# Patient Record
Sex: MALE | URBAN - METROPOLITAN AREA
[De-identification: names, ages, dates, MRNs, and addresses within clinical notes are randomized per-mention and may not be internally consistent; named-entity substitution may affect disease eponyms.]

---

## 2023-06-08 ENCOUNTER — AMB VIDEO VISIT (OUTPATIENT)
Dept: OTHER | Facility: HOSPITAL | Age: 33
End: 2023-06-08

## 2023-06-08 VITALS — RESPIRATION RATE: 16 BRPM

## 2023-06-08 DIAGNOSIS — J30.9 ALLERGIC RHINITIS, UNSPECIFIED SEASONALITY, UNSPECIFIED TRIGGER: Primary | ICD-10-CM

## 2023-06-08 DIAGNOSIS — Z76.89 ENCOUNTER TO ESTABLISH CARE: ICD-10-CM

## 2023-06-08 PROCEDURE — ECARE PR SL URGENT CARE VIRTUAL VISIT: Performed by: NURSE PRACTITIONER

## 2023-06-08 RX ORDER — FLUTICASONE PROPIONATE 50 MCG
1 SPRAY, SUSPENSION (ML) NASAL DAILY
Qty: 16 G | Refills: 1 | Status: SHIPPED | OUTPATIENT
Start: 2023-06-08

## 2023-06-08 NOTE — PROGRESS NOTES
Video Visit - Cyr Page 34 y o  male MRN: [id number]    REQUIRED DOCUMENTATION:         1  This service was provided via [facility]  2  Provider located at [address] [place] [address] Cristian Gann [address] [phone]  3  [facility] provider: LUZMARIA Gonzalez  4  Identify all parties in room with patient during [facility] visit:  Patient   5  After connecting through [employer], patient was identified by name and date of birth  Patient was then informed that this was a Telemedicine visit and that the exam was being conducted confidentially over secure lines  My office door was closed  No one else was in the room  Patient acknowledged consent and understanding of privacy and security of the Telemedicine visit  I informed the patient that I have reviewed their record in Epic and presented the opportunity for them to ask any questions regarding the visit today  The patient agreed to participate  This is a 34year old male here today for video visit  He states symptoms started last night  He states he started last night with itchy eyes and cough  He has a headache  No fever, body aches or chills  He denies any chest pain or sob  He does have cough  He denies any wheezing  He states he took a zyrtec  He states his nose is very stuffed up  He denies any nausea, vomiting or abd pain  He has not tried any tylenol or motrin for headache  He does have some seasonal allergy  He also notes he was working outside all day yesterday  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  Review of Systems   Constitutional: Negative for activity change, chills, fatigue and fever  HENT: Positive for congestion and rhinorrhea  Respiratory: Positive for cough  Negative for shortness of breath and wheezing  Cardiovascular: Negative for chest pain  Gastrointestinal: Negative for diarrhea and vomiting  Neurological: Positive for headaches   Negative for dizziness and light-headedness  Psychiatric/Behavioral: Negative  Vitals:    06/08/23 1507   Resp: 16     Physical Exam  Constitutional:       General: He is not in acute distress  Appearance: Normal appearance  He is not ill-appearing or toxic-appearing  HENT:      Head: Normocephalic and atraumatic  Nose: Congestion present  Eyes:      Conjunctiva/sclera: Conjunctivae normal    Pulmonary:      Effort: Pulmonary effort is normal  No respiratory distress  Comments: No cough or audible wheezing  Skin:     Comments: No rash on head or neck  Neurological:      Mental Status: He is alert and oriented to person, place, and time  Psychiatric:         Mood and Affect: Mood normal          Behavior: Behavior normal          Thought Content: Thought content normal          Judgment: Judgment normal        Diagnoses and all orders for this visit:    Allergic rhinitis, unspecified seasonality, unspecified trigger  -     fluticasone (FLONASE) 50 mcg/act nasal spray; 1 spray into each nostril daily    Encounter to establish care  -     Ambulatory Referral to [facility] Future      Patient Instructions   This is may be related to poor air quality due outside  Would recommend you stay inside  Continue zyrtec  Start flonase  OTC cough and cold medication will also help with symptoms  Follow up with PCP if no improvement  Go to ER with any worsening symptoms, chest pain, sob, wheezing, fevers or flu like symptoms  Follow up with PCP if not improved, if symptoms are worse, go to the ER

## 2023-06-08 NOTE — LETTER
[facility] VISIT VIR  [address] [place] [address]    June 8, 2023     Patient: Yazan Vernon   YOB: 1990   Date of Visit: 6/8/2023       To Whom it May Concern:    Yazan Vernon is under my professional care  He was seen virtually on 6/8/2023  He may return to work on 06/09/2023   If you have any questions or concerns, please don't hesitate to call           Sincerely,          LUZMARIA Rodriguez        CC: No Recipients

## 2023-06-08 NOTE — PATIENT INSTRUCTIONS
This is may be related to poor air quality due outside  Would recommend you stay inside  Continue zyrtec  Start flonase  OTC cough and cold medication will also help with symptoms  Follow up with PCP if no improvement  Go to ER with any worsening symptoms, chest pain, sob, wheezing, fevers or flu like symptoms

## 2023-06-12 NOTE — CARE ANYWHERE EVISITS
Visit Summary for [facility]   Heydi - Gender: Male - Date of Birth: [id number]  Date: [id number] - Duration: 8 minutes  Patient: [facility]   Heydi  Provider: [id number]    Patient Contact Information  Address  [address] [place] [address], [employer] [facility] [address]  [phone]    Visit Topics  Headache [Added By: Self - 2023-06-08]  Earache [Added By: Self - 2023-06-08]    Triage Questions   What is your current physical address in the event of a medical emergency? Answer []  Are you allergic to any medications? Answer []  Are you now or could you be pregnant? Answer []  Do you have any immune system compromise or chronic lung   disease? Answer []  Do you have any vulnerable family members in the home (infant, pregnant, cancer, elderly)? Answer []     Conversation Transcripts  [0A][0A] [Notification] You are connected with Cindyl Clerance Lefort, Urgent Care Specialist [0A][Notification] Lety Mcnamara is located in [place]  [0A][Notification] Lety Mcnamara has shared health history  Robin Resendiz  [0A]    Diagnosis  Allergic rhinitis, unspecified    Procedures  Value: 62681 Code: CPT-4 UNLISTED E&M SERVICE    Medications Prescribed    No prescriptions ordered    Electronically signed by: Clerance Lefort, Cindyl(NPI [id number])

## 2023-06-25 ENCOUNTER — AMB VIDEO VISIT (OUTPATIENT)
Dept: OTHER | Facility: HOSPITAL | Age: 33
End: 2023-06-25

## 2023-06-25 PROCEDURE — ECARE PR SL URGENT CARE VIRTUAL VISIT: Performed by: FAMILY MEDICINE

## 2023-06-25 NOTE — CARE ANYWHERE EVISITS
Visit Summary for [facility] - Gender: Male - Date of Birth: [id number]  Date: [id number] - Duration: 4 minutes  Patient: [facility]  Provider: Ede Hall    Patient Contact Information  Address  [address] [place] [address], [facility] [address]  [phone]    Visit Topics  allergies, headache [Added By: Self - 2023-06-25]    Sick Slip  Reason [ILLNESS]  Remarks [He was seen on 6/25/23  He should be excused from work on 6/25/23 ]  Triage Questions   What is your current physical address in the event of a medical emergency? Answer []  Are you allergic to any medications? Answer []  Are you now or could you be pregnant? Answer []  Do you have any immune system compromise or chronic lung   disease? Answer []  Do you have any vulnerable family members in the home (infant, pregnant, cancer, elderly)? Answer []     Conversation Transcripts  [0A][0A] [Notification] Major Blevins, Global Staff, will help you prepare for your visit  She is assisting Family Michele Physician [0A][[address], and thank you for connecting  While you are waiting for the doctor, are there   any questions I can answer for you about our service? Please contact customer service if you have questions about billing, insurance, or technical issues  Visits work best with a stable WiFi connection, so please make sure you are connected before we   begin [0A][Notification] Major Blevins has left the room  [0A][Notification] You are connected with Family Michele Physician [0A][Notification] Eal Gates is located in [place]  [0A][Notification] Ela Gates has shared health   history  Vijay Talib  [0A][Notification] Ede Hall has added a note  [0A]    Diagnosis  Acute atopic conjunctivitis, bilateral    Procedures  Value: 41233 Code: CPT-4 UNLISTED E&M SERVICE    Medications Prescribed    olopatadine      Frequency :   Patient Instructions : one drop in to each eye twice a day for 7days  Refills : 0  Instructions to the Pharmacist : 7day supply  Substitutions allowed      Provider Notes  [0A][0A] eyes itchy,  watering[0A]not red[0A]no coughing [0A]no fever[0A]no mucus[0A]not used any medication[0A]Pregnancy: N/A[0A]PMH: none[0A]â€¢ Current Medications: none[0A]â€¢ Current Allergies: NKDA[0A]â€¢ Physical Exam: [0A]General- Well-developed,   well-nourished  In no apparent distress  Appears comfortable in home[0A]environment  Alert  [0A]HEENT-Normocephalic, atraumatic  No conjunctival injection or scleral icterus is noted  Pupils are[0A]equal and round  No rhinorrhea  [0A]Respiratory-Chest   exam reveals visually symmetric expansion, normal respiratory rate with no[0A]retractions, no accessory muscle use  No nasal flaring [0A]Neurolgoy: no facial droop, no active tremors[0A]skin: not pallor, no jaundice, no rash[0A]Physical exam limited due   to telehealth[0A]â€¢ Plan/Prescribing:[0A]Allergic conjunctivitis: [0A]patanol eye drops[0A]if crusting or red eyes should call back[0A]work excuse given for today[0A]f/u with  family doc[0A]The patient voiced understanding and agreement with treatment   plan  [0A]â€¢ Referral: [0A]if your symptoms are not getting better should see PCP[0A]If you you have chest pain, shortness of breath , feels some thing not right should[0A]go to ER  or call 911   [0A]ï‚·  If you received a prescription at this visit and   you have a question or problem please call [phone] for prescription assistance[0A]ï‚·  Please print a copy of this note and send it to your regular doctor, or take it to your next visit so it[0A]may be included in your medical record [0A]ï‚·    Please see your primary care provider on an annual basis or more frequently if directed[0A]    Electronically signed byHarley Michael(NPI [id number])

## 2023-06-26 ENCOUNTER — AMB VIDEO VISIT (OUTPATIENT)
Dept: OTHER | Facility: HOSPITAL | Age: 33
End: 2023-06-26

## 2023-07-11 ENCOUNTER — AMB VIDEO VISIT (OUTPATIENT)
Dept: OTHER | Facility: HOSPITAL | Age: 33
End: 2023-07-11

## 2023-07-11 DIAGNOSIS — K52.9 GASTROENTERITIS: Primary | ICD-10-CM

## 2023-07-11 PROCEDURE — ECARE PR SL URGENT CARE VIRTUAL VISIT: Performed by: PHYSICIAN ASSISTANT

## 2023-07-11 RX ORDER — ONDANSETRON 4 MG/1
4 TABLET, FILM COATED ORAL EVERY 8 HOURS PRN
Qty: 20 TABLET | Refills: 0 | Status: SHIPPED | OUTPATIENT
Start: 2023-07-11

## 2023-07-11 NOTE — LETTER
July 11, 2023     Patient: Alanna Felton   YOB: 1990   Date of Visit: 7/11/2023       To Whom it May Concern:    Alanna Felton is under my professional care. He was seen virtually on 7/11/2023. He may return to work on 7/12/23 . If you have any questions or concerns, please don't hesitate to call.          Sincerely,          Amita Brito PA-C        CC: No Recipients

## 2023-07-11 NOTE — CARE ANYWHERE EVISITS
Visit Summary for DOT Arnulfo LOPEZ - Gender: Male - Date of Birth: [id number]  Date: [id number] - Duration: 12 minutes  Patient: DOT LOPEZ  Provider: Francois Enriquez PA-C    Patient Contact Information  Address  [place]; [address]  [phone]    Visit Topics  Stomachache [Added By: Self - 2023-07-11]    Triage Questions   What is your current physical address in the event of a medical emergency? Answer []  Are you allergic to any medications? Answer []  Are you now or could you be pregnant? Answer []  Do you have any immune system compromise or chronic lung   disease? Answer []  Do you have any vulnerable family members in the home (infant, pregnant, cancer, elderly)? Answer []     Conversation Transcripts  [0A][0A] [Notification] You are connected with Francois Enriquez PA-C, Urgent Care [address] [place] [address] is located in [address] [place] [address] [address]. [0A][Notification] Rommie Severin has shared health history. [facility] .[0A]    Diagnosis  Infectious gastroenteritis and colitis, unspecified    Procedures  Value: 98869 Code: CPT-4 UNLISTED E&M SERVICE    Medications Prescribed    No prescriptions ordered    Electronically signed by: Ayala Zavala(NPI [id number])

## 2023-07-11 NOTE — PROGRESS NOTES
Video Visit - Sary Ziegler 34 y.o. male MRN: [id number]    REQUIRED DOCUMENTATION:         1. This service was provided via [facility]. 2. Provider located at [facility]  [address] [address] [phone]. 3. [facility] provider: Diego Kwan PA-C.  4. Identify all parties in room with patient during [facility] visit:  No one else  5. After connecting through [employer], patient was identified by name and date of birth and 's license. Patient was then informed that this was a Telemedicine visit and that the exam was being conducted confidentially over secure lines. My office door was closed. No one else was in the room. Patient acknowledged consent and understanding of privacy and security of the Telemedicine visit. I informed the patient that I have reviewed their record in Epic and presented the opportunity for them to ask any questions regarding the visit today. The patient agreed to participate. VITALS: Heart Rate: 84 BPM, Respiratory Rate: 20 RPM, Temperature Unavailable° F, Blood Pressure Unavailable mmHg, Pulse Ox Unavailable % on RA    HPI  Pt reports woke up today with stomach pain which he describes as heart burn which was periumbilical to epigastric. Associated with diarrhea x 2, no blood or mucous. Nothing tried to relieve pain. Reports it is resolved now. Ate an [employer] sandwich for lunch yesterday and didn't eat dinner. Denies vomiting, fevers. Denies recent travel, recent abx, recent sick contacts. No surgical hx. No cold sx. Physical Exam  Constitutional:       General: He is not in acute distress. Appearance: Normal appearance. He is not toxic-appearing. HENT:      Head: Normocephalic and atraumatic. Nose: No rhinorrhea. Mouth/Throat:      Mouth: Mucous membranes are moist.   Eyes:      Conjunctiva/sclera: Conjunctivae normal.   Cardiovascular:      Rate and Rhythm: Normal rate.    Pulmonary:      Effort: Pulmonary effort is normal. No respiratory distress. Breath sounds: No wheezing (no gross audible wheeze through computer). Abdominal:      General: Abdomen is flat. Palpations: Abdomen is soft. Tenderness: There is abdominal tenderness (mild subjective LUQ and LLQ). Musculoskeletal:      Cervical back: Normal range of motion. Skin:     Coloration: Skin is pale (likely normal skin tone). Findings: No rash (on face or neck). Neurological:      Mental Status: He is alert. Cranial Nerves: No dysarthria or facial asymmetry. Psychiatric:         Mood and Affect: Mood normal.         Behavior: Behavior normal.         Diagnoses and all orders for this visit:    Gastroenteritis  -     ondansetron (ZOFRAN) 4 mg tablet; Take 1 tablet (4 mg total) by mouth every 8 (eight) hours as needed for nausea or vomiting      Patient Instructions     Avoid dairy x 1-2 weeks  Clear liquid to bland diet as tolerated. No greasy, spicy or acidic foods/drinks. Schedule a follow-up appointment with your primary care physician for recheck in 2-3 days. If you cannot see your PCP, you can schedule a follow up appointment at a [facility]. Go to the emergency department if you develop any new or worsening symptoms including worsening abdominal pain, fevers, or anything else that is concerning. Excuses can be found in "Letters" section of [employer] amanda. Can print if opened from a [address] phone number is [phone] if you need assistance or have further questions    1 21 [phone]) [employer] ([phone])  Schedule or Reschedule Outpatient Testing - Option 2  Billing - Option 3  General Info - Option 4  [facility] Help - Option 5  Comprehensive Spine Program - Option 6   COVID - Option 7    Gastroenteritis   WHAT YOU NEED TO KNOW:   Gastroenteritis, or stomach flu, is an infection of the stomach and intestines.         DISCHARGE INSTRUCTIONS:   Call 911 for any of the following:   • You have trouble breathing or a very fast pulse.      Return to the emergency department if:   • You see blood in your diarrhea. • You cannot stop vomiting. • You have not urinated for 12 hours. • You feel like you are going to faint. Contact your healthcare provider if:   • You have a fever. • You continue to vomit or have diarrhea, even after treatment. • You see worms in your diarrhea. • Your mouth or eyes are dry. You are not urinating as much or as often. • You have questions or concerns about your condition or care. Medicines:   • Medicines  may be given to stop vomiting or diarrhea, decrease abdominal cramps, or treat an infection. • Take your medicine as directed. Contact your healthcare provider if you think your medicine is not helping or if you have side effects. Tell your provider if you are allergic to any medicine. Keep a list of the medicines, vitamins, and herbs you take. Include the amounts, and when and why you take them. Bring the list or the pill bottles to follow-up visits. Carry your medicine list with you in case of an emergency. Manage your symptoms:   • Drink liquids as directed. Ask your healthcare provider how much liquid to drink each day, and which liquids are best for you. You may also need to drink an oral rehydration solution (ORS). An ORS has the right amounts of sugar, salt, and minerals in water to replace body fluids. • Eat bland foods. When you feel hungry, begin eating soft, bland foods. Examples are bananas, clear soup, potatoes, and applesauce. Do not have dairy products, alcohol, sugary drinks, or drinks with caffeine until you feel better. • Rest as much as possible. Slowly start to do more each day when you begin to feel better. Prevent the spread of gastroenteritis:  Gastroenteritis can spread easily. Keep yourself, your family, and your surroundings clean to help prevent the spread of gastroenteritis:  • Wash your hands often. Use soap and water.  Wash your hands after you use the bathroom, change a child's diapers, or sneeze. Wash your hands before you prepare or eat food. • Clean surfaces and do laundry often. Wash your clothes and towels separately from the rest of the laundry. Clean surfaces in your home with antibacterial  or bleach. • Clean food thoroughly and cook safely. Wash raw vegetables before you cook. Cook meat, fish, and eggs fully. Do not use the same dishes for raw meat as you do for other foods. Refrigerate any leftover food immediately. • Be aware when you camp or travel. Drink only clean water. Do not drink from rivers or lakes unless you purify or boil the water first. When you travel, drink bottled water and do not add ice. Do not eat fruit that has not been peeled. Do not eat raw fish or meat that is not fully cooked. Follow up with your doctor as directed:  Write down your questions so you remember to ask them during your visits. © Copyright [employer] 2022 Information is for End User's use only and may not be sold, redistributed or otherwise used for commercial purposes. The above information is an  only. It is not intended as medical advice for individual conditions or treatments. Talk to your doctor, nurse or pharmacist before following any medical regimen to see if it is safe and effective for you.

## 2023-07-11 NOTE — PATIENT INSTRUCTIONS
Avoid dairy x 1-2 weeks  Clear liquid to bland diet as tolerated. No greasy, spicy or acidic foods/drinks. Schedule a follow-up appointment with your primary care physician for recheck in 2-3 days. If you cannot see your PCP, you can schedule a follow up appointment at a [facility]. Go to the emergency department if you develop any new or worsening symptoms including worsening abdominal pain, fevers, or anything else that is concerning. Excuses can be found in "Letters" section of [employer] amanda. Can print if opened from a [employer] phone number is [phone] if you need assistance or have further questions    1 21 [phone]) [employer] ([phone])  Schedule or Reschedule Outpatient Testing - Option 2  Billing - Option 3  General Info - Option 4  [employer] Help - Option 5  Comprehensive Spine Program - Option 6   COVID - Option 7    Gastroenteritis   WHAT YOU NEED TO KNOW:   Gastroenteritis, or stomach flu, is an infection of the stomach and intestines. DISCHARGE INSTRUCTIONS:   Call 911 for any of the following: You have trouble breathing or a very fast pulse. Return to the emergency department if:   You see blood in your diarrhea. You cannot stop vomiting. You have not urinated for 12 hours. You feel like you are going to faint. Contact your healthcare provider if:   You have a fever. You continue to vomit or have diarrhea, even after treatment. You see worms in your diarrhea. Your mouth or eyes are dry. You are not urinating as much or as often. You have questions or concerns about your condition or care. Medicines:   Medicines  may be given to stop vomiting or diarrhea, decrease abdominal cramps, or treat an infection. Take your medicine as directed. Contact your healthcare provider if you think your medicine is not helping or if you have side effects. Tell your provider if you are allergic to any medicine.  Keep a list of the medicines, vitamins, and herbs you take. Include the amounts, and when and why you take them. Bring the list or the pill bottles to follow-up visits. Carry your medicine list with you in case of an emergency. Manage your symptoms:   Drink liquids as directed. Ask your healthcare provider how much liquid to drink each day, and which liquids are best for you. You may also need to drink an oral rehydration solution (ORS). An ORS has the right amounts of sugar, salt, and minerals in water to replace body fluids. Eat bland foods. When you feel hungry, begin eating soft, bland foods. Examples are bananas, clear soup, potatoes, and applesauce. Do not have dairy products, alcohol, sugary drinks, or drinks with caffeine until you feel better. Rest as much as possible. Slowly start to do more each day when you begin to feel better. Prevent the spread of gastroenteritis:  Gastroenteritis can spread easily. Keep yourself, your family, and your surroundings clean to help prevent the spread of gastroenteritis:  Wash your hands often. Use soap and water. Wash your hands after you use the bathroom, change a child's diapers, or sneeze. Wash your hands before you prepare or eat food. Clean surfaces and do laundry often. Wash your clothes and towels separately from the rest of the laundry. Clean surfaces in your home with antibacterial  or bleach. Clean food thoroughly and cook safely. Wash raw vegetables before you cook. Cook meat, fish, and eggs fully. Do not use the same dishes for raw meat as you do for other foods. Refrigerate any leftover food immediately. Be aware when you camp or travel. Drink only clean water. Do not drink from rivers or lakes unless you purify or boil the water first. When you travel, drink bottled water and do not add ice. Do not eat fruit that has not been peeled. Do not eat raw fish or meat that is not fully cooked.     Follow up with your doctor as directed:  Write down your questions so you remember to ask them during your visits. © Copyright [employer] Fernandez 2022 Information is for End User's use only and may not be sold, redistributed or otherwise used for commercial purposes. The above information is an  only. It is not intended as medical advice for individual conditions or treatments. Talk to your doctor, nurse or pharmacist before following any medical regimen to see if it is safe and effective for you.

## 2023-07-18 ENCOUNTER — AMB VIDEO VISIT (OUTPATIENT)
Dept: OTHER | Facility: HOSPITAL | Age: 33
End: 2023-07-18

## 2023-07-18 DIAGNOSIS — H10.13 ALLERGIC CONJUNCTIVITIS OF BOTH EYES: Primary | ICD-10-CM

## 2023-07-18 PROCEDURE — ECARE PR SL URGENT CARE VIRTUAL VISIT: Performed by: PHYSICIAN ASSISTANT

## 2023-07-18 RX ORDER — OLOPATADINE HYDROCHLORIDE 1 MG/ML
1 SOLUTION/ DROPS OPHTHALMIC 2 TIMES DAILY
Qty: 5 ML | Refills: 0 | Status: SHIPPED | OUTPATIENT
Start: 2023-07-18

## 2023-07-18 NOTE — LETTER
July 18, 2023     Patient: Bryce White   YOB: 1990   Date of Visit: 7/18/2023       To Whom it May Concern:    Bryce White is under my professional care. He was seen virtually on 7/18/2023. He may return to work on 7/19/23 . If you have any questions or concerns, please don't hesitate to call.          Sincerely,          Rylee Driver PA-C        CC: No Recipients

## 2023-07-19 ENCOUNTER — AMB VIDEO VISIT (OUTPATIENT)
Dept: OTHER | Facility: HOSPITAL | Age: 33
End: 2023-07-19

## 2023-07-19 NOTE — PROGRESS NOTES
Video Visit - Augustine Villalpando 34 y.o. male MRN: [id number]    REQUIRED DOCUMENTATION:         1. This service was provided via [employer]. 2. Provider located at [address] [place] [address]  [address] [address] [phone]. 3. [employer] provider: Laura Zepeda PA-C.  4. Identify all parties in room with patient during [facility] visit:  No one else  5. After connecting through [employer], patient was identified by name and date of birth. Patient was then informed that this was a Telemedicine visit and that the exam was being conducted confidentially over secure lines. My office door was closed. No one else was in the room. Patient acknowledged consent and understanding of privacy and security of the Telemedicine visit. I informed the patient that I have reviewed their record in Epic and presented the opportunity for them to ask any questions regarding the visit today. The patient agreed to participate. VITALS: Heart Rate: 88 BPM, Respiratory Rate: 20 RPM, Temperature Unavailable° F, Blood Pressure Unavailable mmHg, Pulse Ox Unavailable % on RA    HPI  Pt reports woke up today with allergies- sore throat, sore crusty eyes with occasional watery discharge. No changes to vision or runny nose, ear pain, CP or SOB, fevers. Nothing tried for sx. No drainage appreciated now. Needs a note for work  Physical Exam  Constitutional:       General: He is not in acute distress. Appearance: Normal appearance. He is not toxic-appearing. HENT:      Head: Normocephalic and atraumatic. Nose: No rhinorrhea. Mouth/Throat:      Mouth: Mucous membranes are moist.      Pharynx: Uvula midline. No posterior oropharyngeal erythema or uvula swelling. Tonsils: No tonsillar exudate. 0 on the right. 0 on the left. Eyes:      General:         Right eye: No discharge. Left eye: No discharge. Extraocular Movements: Extraocular movements intact.       Conjunctiva/sclera: Conjunctivae normal. Comments: Scant injection b/l conjunctiva   Cardiovascular:      Rate and Rhythm: Normal rate. Pulmonary:      Effort: Pulmonary effort is normal. No respiratory distress. Breath sounds: No wheezing (no gross audible wheeze through computer). Musculoskeletal:      Cervical back: Normal range of motion. Skin:     Findings: No rash (on face or neck). Neurological:      Mental Status: He is alert. Cranial Nerves: No dysarthria or facial asymmetry. Psychiatric:         Mood and Affect: Mood normal.         Behavior: Behavior normal.         Diagnoses and all orders for this visit:    Allergic conjunctivitis of both eyes  -     olopatadine (PATANOL) 0.1 % ophthalmic solution; Administer 1 drop to both eyes 2 (two) times a day      Patient Instructions     Conjunctivitis   WHAT YOU NEED TO KNOW:   Conjunctivitis, or pink eye, is inflammation of your conjunctiva. The conjunctiva is a thin tissue that covers the front of your eye and the back of your eyelids. The conjunctiva helps protect your eye and keep it moist. Conjunctivitis may be caused by bacteria, allergies, or a virus. If your conjunctivitis is caused by bacteria, it may get better on its own in about 7 days. Viral conjunctivitis can last up to 3 weeks. DISCHARGE INSTRUCTIONS:   Return to the emergency department if:   • You have worsening eye pain. • The swelling in your eye gets worse, even after treatment. • Your vision suddenly becomes worse or you cannot see at all. Call your doctor if:   • You develop a fever and ear pain. • You have tiny bumps or spots of blood on your eye. • You have questions or concerns about your condition or care. Manage your symptoms:   • Apply a cool compress. Wet a washcloth with cold water and place it on your eye. This will help decrease itching and irritation. • Do not wear contact lenses. They can irritate your eye.  Throw away the pair you are using and ask when you can wear them again. Use a new pair of lenses when your provider says it is okay. • Avoid irritants. Stay away from smoke filled areas. Shield your eyes from wind and sun. • Flush your eye. You may need to flush your eye with saline to help decrease your symptoms. Ask for more information on how to flush your eye. Medicines:  Treatment depends on what is causing your conjunctivitis. You may be given any of the following:  • Allergy medicine  helps decrease itchy, red, swollen eyes caused by allergies. It may be given as a pill, eye drops, or nasal spray. • Antibiotics  may be needed if your conjunctivitis is caused by bacteria. This medicine may be given as a pill, eye drops, or eye ointment. • Take your medicine as directed. Contact your healthcare provider if you think your medicine is not helping or if you have side effects. Tell your provider if you are allergic to any medicine. Keep a list of the medicines, vitamins, and herbs you take. Include the amounts, and when and why you take them. Bring the list or the pill bottles to follow-up visits. Carry your medicine list with you in case of an emergency. Prevent the spread of conjunctivitis:   • Wash your hands with soap and water often. Wash your hands before and after you touch your eyes. Also wash your hands before you prepare or eat food and after you use the bathroom or change a diaper. • Avoid allergens. Try to avoid the things that cause your allergies, such as pets, dust, or grass. • Avoid contact with others. Do not share towels or washcloths. Try to stay away from others as much as possible. Ask when you can return to work or school. • Throw away eye makeup. The bacteria that caused your conjunctivitis can stay in eye makeup. Throw away your current mascara and other eye makeup. Never share mascara or other eye makeup with anyone.     Follow up with your doctor as directed:  Write down your questions so you remember to ask them during your visits. © Copyright [employer] 2022 Information is for End User's use only and may not be sold, redistributed or otherwise used for commercial purposes. The above information is an  only. It is not intended as medical advice for individual conditions or treatments. Talk to your doctor, nurse or pharmacist before following any medical regimen to see if it is safe and effective for you.

## 2023-07-19 NOTE — PATIENT INSTRUCTIONS
Conjunctivitis   WHAT YOU NEED TO KNOW:   Conjunctivitis, or pink eye, is inflammation of your conjunctiva. The conjunctiva is a thin tissue that covers the front of your eye and the back of your eyelids. The conjunctiva helps protect your eye and keep it moist. Conjunctivitis may be caused by bacteria, allergies, or a virus. If your conjunctivitis is caused by bacteria, it may get better on its own in about 7 days. Viral conjunctivitis can last up to 3 weeks. DISCHARGE INSTRUCTIONS:   Return to the emergency department if:   You have worsening eye pain. The swelling in your eye gets worse, even after treatment. Your vision suddenly becomes worse or you cannot see at all. Call your doctor if:   You develop a fever and ear pain. You have tiny bumps or spots of blood on your eye. You have questions or concerns about your condition or care. Manage your symptoms:   Apply a cool compress. Wet a washcloth with cold water and place it on your eye. This will help decrease itching and irritation. Do not wear contact lenses. They can irritate your eye. Throw away the pair you are using and ask when you can wear them again. Use a new pair of lenses when your provider says it is okay. Avoid irritants. Stay away from smoke filled areas. Shield your eyes from wind and sun. Flush your eye. You may need to flush your eye with saline to help decrease your symptoms. Ask for more information on how to flush your eye. Medicines:  Treatment depends on what is causing your conjunctivitis. You may be given any of the following: Allergy medicine  helps decrease itchy, red, swollen eyes caused by allergies. It may be given as a pill, eye drops, or nasal spray. Antibiotics  may be needed if your conjunctivitis is caused by bacteria. This medicine may be given as a pill, eye drops, or eye ointment. Take your medicine as directed.   Contact your healthcare provider if you think your medicine is not helping or if you have side effects. Tell your provider if you are allergic to any medicine. Keep a list of the medicines, vitamins, and herbs you take. Include the amounts, and when and why you take them. Bring the list or the pill bottles to follow-up visits. Carry your medicine list with you in case of an emergency. Prevent the spread of conjunctivitis:   Wash your hands with soap and water often. Wash your hands before and after you touch your eyes. Also wash your hands before you prepare or eat food and after you use the bathroom or change a diaper. Avoid allergens. Try to avoid the things that cause your allergies, such as pets, dust, or grass. Avoid contact with others. Do not share towels or washcloths. Try to stay away from others as much as possible. Ask when you can return to work or school. Throw away eye makeup. The bacteria that caused your conjunctivitis can stay in eye makeup. Throw away your current mascara and other eye makeup. Never share mascara or other eye makeup with anyone. Follow up with your doctor as directed:  Write down your questions so you remember to ask them during your visits. © Copyright [employer] 2022 Information is for End User's use only and may not be sold, redistributed or otherwise used for commercial purposes. The above information is an  only. It is not intended as medical advice for individual conditions or treatments. Talk to your doctor, nurse or pharmacist before following any medical regimen to see if it is safe and effective for you.

## 2023-07-19 NOTE — CARE ANYWHERE EVISITS
Visit Summary for DOT LOPEZ - Gender: Male - Date of Birth: [id number]  Date: [id number] - Duration: 6 minutes  Patient: DOT LOPEZ  Provider: Herminio Gonzalez PA-C    Patient Contact Information  Address  [address] [place] [address],[address] [place] [address]  [phone]    Visit Topics  allergies  [Added By: Self - 2023-07-19]  Headache [Added By: Self - 2023-07-19]    Triage Questions   What is your current physical address in the event of a medical emergency? Answer []  Are you allergic to any medications? Answer []  Are you now or could you be pregnant? Answer []  Do you have any immune system compromise or chronic lung   disease? Answer []  Do you have any vulnerable family members in the home (infant, pregnant, cancer, elderly)? Answer []     Conversation Transcripts  [0A][0A] [Notification] You are connected with Herminio Gonzalez PA-C, Urgent Care [address] [place] [address] is located in [place]. [0A][Notification] Fabi Addison has shared health history. Cathleen Annmarie .[0A]    Diagnosis  Acute atopic conjunctivitis, bilateral    Procedures  Value: 42441 Code: CPT-4 UNLISTED E&M SERVICE    Medications Prescribed    No prescriptions ordered    Electronically signed by: Ayala Pederson(NPI [id number])

## 2024-02-25 ENCOUNTER — AMB VIDEO VISIT (OUTPATIENT)
Dept: OTHER | Facility: HOSPITAL | Age: 34
End: 2024-02-25

## 2024-02-25 VITALS — RESPIRATION RATE: 12 BRPM | HEART RATE: 68 BPM

## 2024-02-25 DIAGNOSIS — R68.89 FLU-LIKE SYMPTOMS: Primary | ICD-10-CM

## 2024-02-25 PROCEDURE — ECARE PR SL URGENT CARE VIRTUAL VISIT: Performed by: PHYSICIAN ASSISTANT

## 2024-02-25 NOTE — PATIENT INSTRUCTIONS
"Care Anywhere Phone number is [phone] if you need assistance or have further questions  note in \"Letters\" section of [employer] amanda. Can print if opened from a web browser    You likely have a virus such as the flu or a cold. Please schedule a follow-up with your PCP within the next 2 days for recheck. Go to the ER for new worsening or concerning symptoms including but not limited to shortness of breath or chest pain    You can have fever for 3-5 days with a viral illness and then any additional symptoms that develop (congestion,cough, nausea, diarrhea) can last for another 7 days.      Stay out of work/school until fever free for 24 hours without use of tylenol or motrin     Make sure you have a thermometer and if you feel chills or sweats check it and write it down.  Take Tylenol (acetaminophen) and Motrin (ibuprofen) for fevers, body aches, and headaches. Alternate Motrin and Tylenol every 3 hours for more consistent relief.     Drink plenty of fluids to stay well hydrated- at least 2 L per day for adults  Water, hot water with lemon or honey, warm tea.   Can drink Pedialyte, Gatorade/Powerade zero, but should mix with water.      For diarrhea, vomiting and abdominal pain   Milk or juice can make diarrhea worse.  follow \"BRAT\" diet Bananas, Rice, Applesauce, Toast (also plain pasta or crackers)  Small frequent meals   Allow diarrhea to run its course. Most cases will resolved in 3-5 days     Use over-the-counter saline nasal spray/allergy medication for congestion, runny nose, and postnasal drip  Mucinex (guaifenesin) helps to thin and loosen mucous.   Claritin, Zyrtec, Xyzal, or Allegra are non-drowsy antihistamines- helps dry out mucous (will make mucous darker)  Delsym or robitussin (dextromethorphan) is a cough suppressant.  Oral decongestants- pseudoephedrine behind the counter pill helps with nasal and sinus congestion  Nasal Decongestants- phenylephrine or fluticasone nasal spray (oxymetazoline up to 3 " days)  Vicks to the front/back of the chest bottom of the feet with socks     For sore throat relief  Warm salt water gargles, warm tea with honey as needed  Cool mist humidifier at bedside- helps keep airway moist  Chloraseptic spray or throat lozenges with benzocaine (cepacol max strength).

## 2024-02-25 NOTE — CARE ANYWHERE EVISITS
Visit Summary for DOT LOPEZ - Gender: Male - Date of Birth: 1990  Date: [id number] - Duration: 5 minutes  Patient: DOT LOPEZ  Provider: Shannon Severino PA-C    Patient Contact Information  Address  [address]; [place] [address]  [phone]    Visit Topics  Headache [Added By: Self - 2024-02-25]  Stomachache [Added By: Self - 2024-02-25]    Triage Questions   What is your current physical address in the event of a medical emergency? Answer []  Are you allergic to any medications? Answer []  Are you now or could you be pregnant? Answer []  Do you have any immune system compromise or chronic lung   disease? Answer []  Do you have any vulnerable family members in the home (infant, pregnant, cancer, elderly)? Answer []     Conversation Transcripts  [0A][0A] [Notification] You are connected with Shannon Severino PA-C, Urgent Care Specialist.[0A][Notification] DOT LOPEZ is located in [place].[0A][Notification] DOT LOPEZ has shared health history...[0A]    Diagnosis  Other general symptoms and signs    Procedures  Value: 78437 Code: CPT-4 UNLISTED E&M SERVICE    Medications Prescribed    No prescriptions ordered    Electronically signed by: Severino PA-C, Shannon(NPI [id number])

## 2024-02-25 NOTE — LETTER
February 25, 2024     Patient: Jordon Soliz   YOB: 1990   Date of Visit: 2/25/2024       To Whom it May Concern:    Jordon Soliz is under my professional care. He was seen virtually on 2/25/2024. He may return to work on 2/26/24 .    If you have any questions or concerns, please don't hesitate to call.         Sincerely,          Shannon D Severino, PA-C        CC: No Recipients

## 2024-02-25 NOTE — PROGRESS NOTES
Required Documentation:  Encounter provider Shannon D Severino, PA-C    Provider located at [facility]  VIRTUAL CARE   [address] [facility] [address]    Identify all parties in room with patient during virtual visit:  No one else    The patient was identified by name and date of birth. Jordon Soliz was informed that this is a telemedicine visit and that the visit is being conducted through the [employer] platform. He agrees to proceed..  My office door was closed. No one else was in the room.  He acknowledged consent and understanding of privacy and security of the video platform. The patient has agreed to participate and understands they can discontinue the visit at any time.    Verification of patient location:    Patient is located at Home in the following state in which I hold an active license [place]    Patient is aware this is a billable service.     Reason for visit is No chief complaint on file.       Subjective  HPI   Pt reports yesterday he developed nasal congestion, woke up with a HA today, nausea, diarrhea x4, fatigue, body aches. No blood or mucous in stool. Took tylenol with some relief. COVID testing not performed. No known sick contacts.     No past medical history on file.    No past surgical history on file.     No Known Allergies    Review of Systems   Constitutional:  Positive for fatigue and fever.   HENT:  Positive for congestion. Negative for nosebleeds.    Eyes:  Negative for redness.   Respiratory:  Negative for shortness of breath.    Cardiovascular:  Negative for chest pain.   Gastrointestinal:  Positive for diarrhea and nausea. Negative for blood in stool and vomiting.   Genitourinary:  Negative for hematuria.   Musculoskeletal:  Negative for gait problem.   Skin:  Negative for rash.   Neurological:  Negative for seizures.   Psychiatric/Behavioral:  Negative for behavioral problems.        Video Exam    Vitals:    02/25/24 1545  "  Pulse: 68   Resp: 12       Physical Exam  Constitutional:       General: He is not in acute distress.     Appearance: Normal appearance. He is ill-appearing. He is not toxic-appearing.   HENT:      Head: Normocephalic and atraumatic.      Nose: No rhinorrhea.      Mouth/Throat:      Mouth: Mucous membranes are moist.   Eyes:      Conjunctiva/sclera: Conjunctivae normal.   Cardiovascular:      Rate and Rhythm: Normal rate.   Pulmonary:      Effort: Pulmonary effort is normal. No respiratory distress.      Breath sounds: No wheezing (no gross audible wheeze through computer).   Musculoskeletal:      Cervical back: Normal range of motion.   Skin:     Findings: No rash (on face or neck).   Neurological:      Mental Status: He is alert.      Cranial Nerves: No dysarthria or facial asymmetry.   Psychiatric:         Mood and Affect: Mood normal.         Behavior: Behavior normal.         Visit Time  Total Visit Duration: 6 minutes    Assessment/Plan:    Diagnoses and all orders for this visit:    Flu-like symptoms  -     COVID/FLU; Future        Patient Instructions   Care Anywhere Phone number is [phone] if you need assistance or have further questions  note in \"Letters\" section of [employer]anda. Can print if opened from a web browser    You likely have a virus such as the flu or a cold. Please schedule a follow-up with your PCP within the next 2 days for recheck. Go to the ER for new worsening or concerning symptoms including but not limited to shortness of breath or chest pain    You can have fever for 3-5 days with a viral illness and then any additional symptoms that develop (congestion,cough, nausea, diarrhea) can last for another 7 days.      Stay out of work/school until fever free for 24 hours without use of tylenol or motrin     Make sure you have a thermometer and if you feel chills or sweats check it and write it down.  Take Tylenol (acetaminophen) and Motrin (ibuprofen) for fevers, body aches, and " "headaches. Alternate Motrin and Tylenol every 3 hours for more consistent relief.     Drink plenty of fluids to stay well hydrated- at least 2 L per day for adults  Water, hot water with lemon or honey, warm tea.   Can drink Pedialyte, Gatorade/Powerade zero, but should mix with water.      For diarrhea, vomiting and abdominal pain   Milk or juice can make diarrhea worse.  follow \"BRAT\" diet Bananas, Rice, Applesauce, Toast (also plain pasta or crackers)  Small frequent meals   Allow diarrhea to run its course. Most cases will resolved in 3-5 days     Use over-the-counter saline nasal spray/allergy medication for congestion, runny nose, and postnasal drip  Mucinex (guaifenesin) helps to thin and loosen mucous.   Claritin, Zyrtec, Xyzal, or Allegra are non-drowsy antihistamines- helps dry out mucous (will make mucous darker)  Delsym or robitussin (dextromethorphan) is a cough suppressant.  Oral decongestants- pseudoephedrine behind the counter pill helps with nasal and sinus congestion  Nasal Decongestants- phenylephrine or fluticasone nasal spray (oxymetazoline up to 3 days)  Vicks to the front/back of the chest bottom of the feet with socks     For sore throat relief  Warm salt water gargles, warm tea with honey as needed  Cool mist humidifier at bedside- helps keep airway moist  Chloraseptic spray or throat lozenges with benzocaine (cepacol max strength).    "

## 2024-06-08 ENCOUNTER — AMB VIDEO VISIT (OUTPATIENT)
Dept: OTHER | Facility: HOSPITAL | Age: 34
End: 2024-06-08

## 2024-06-08 VITALS — RESPIRATION RATE: 16 BRPM | HEART RATE: 96 BPM

## 2024-06-08 DIAGNOSIS — R11.2 NAUSEA AND VOMITING, UNSPECIFIED VOMITING TYPE: Primary | ICD-10-CM

## 2024-06-08 PROCEDURE — ECARE PR SL URGENT CARE VIRTUAL VISIT: Performed by: PHYSICIAN ASSISTANT

## 2024-06-08 RX ORDER — PANTOPRAZOLE SODIUM 40 MG/1
40 TABLET, DELAYED RELEASE ORAL DAILY
Qty: 30 TABLET | Refills: 0 | Status: SHIPPED | OUTPATIENT
Start: 2024-06-08 | End: 2024-07-08

## 2024-06-08 RX ORDER — ONDANSETRON 4 MG/1
4 TABLET, FILM COATED ORAL EVERY 8 HOURS PRN
Qty: 10 TABLET | Refills: 0 | Status: SHIPPED | OUTPATIENT
Start: 2024-06-08

## 2024-06-08 NOTE — PROGRESS NOTES
Required Documentation:  Encounter provider: Kurt Pratt PA-C    Identify all parties in room with patient during virtual visit:  significant other-permission granted    The patient was identified by name and date of birth. Jordon Soliz was informed that this is a telemedicine visit and that the visit is being conducted through the [employer] platform. He agrees to proceed..  My office door was closed. No one else was in the room.  He acknowledged consent and understanding of privacy and security of the video platform. The patient has agreed to participate and understands they can discontinue the visit at any time.    Verification of patient location:  Patient is located at Home in the following state in which I hold an active license [place]    Patient is aware this is a billable service.     Reason for visit is No chief complaint on file.       Subjective  HPI   Pt reports that last night he began having stomach pain and threw up 2x, today stomach pain with headache. Some light body aches, feeling weak. Hasn't eaten anything today. Is able to tolerate fluids and gatorade. Hasn't taken any medications. No nausea currently. Headache currently mild in frontal area, no blurred vision or change in gait or balance. 1 loose stool this am. No blood in vomit or stool. Emesis was just his prior meal no blood or bile. No cough, SOB, CP. Only ate to[employer] pizza rolls yesterday, only thing he ate yesterday. Has had reflux in past. No fever or chills. Feeling better currently.     No past medical history on file.    No past surgical history on file.     No Known Allergies    Review of Systems   Constitutional:  Positive for appetite change. Negative for chills and fever.   HENT:  Negative for congestion and sore throat.    Respiratory:  Negative for cough, shortness of breath and wheezing.    Cardiovascular:  Negative for chest pain, palpitations and leg swelling.   Gastrointestinal:  Positive for diarrhea, nausea  and vomiting. Negative for abdominal pain.        Mild stomach ache   Genitourinary: Negative.    Musculoskeletal:  Positive for myalgias. Negative for arthralgias and gait problem.   Neurological:  Positive for headaches. Negative for dizziness, facial asymmetry, weakness, light-headedness and numbness.       Video Exam    Vitals:    06/08/24 1843   Pulse: 96   Resp: 16       Physical Exam  Constitutional:       General: He is not in acute distress.     Appearance: He is well-developed.   HENT:      Head: Normocephalic and atraumatic.      Right Ear: External ear normal.      Left Ear: External ear normal.      Nose: No rhinorrhea.      Mouth/Throat:      Mouth: Mucous membranes are moist.   Eyes:      General: No scleral icterus.     Extraocular Movements: Extraocular movements intact.   Neck:      Trachea: No tracheal deviation.   Cardiovascular:      Rate and Rhythm: Normal rate.   Pulmonary:      Effort: Pulmonary effort is normal. No respiratory distress.      Breath sounds: No stridor.   Abdominal:      General: Abdomen is flat. There is no distension.      Palpations: Abdomen is soft.      Tenderness: There is abdominal tenderness (Mild epigastric tenderness on directed self exam). There is no guarding or rebound.      Comments: Abdominal exam performed by patient under my direction   Musculoskeletal:      Cervical back: Neck supple.   Skin:     Findings: No erythema.   Neurological:      Mental Status: He is alert.   Psychiatric:         Behavior: Behavior normal.         Visit Time  Total Visit Duration: 18 minutes    Assessment/Plan:    Diagnoses and all orders for this visit:    Nausea and vomiting, unspecified vomiting type  -     pantoprazole (PROTONIX) 40 mg tablet; Take 1 tablet (40 mg total) by mouth daily  -     ondansetron (ZOFRAN) 4 mg tablet; Take 1 tablet (4 mg total) by mouth every 8 (eight) hours as needed for nausea or vomiting      Patient with 2 episodes of nausea vomiting late last night  after eating frozen cyst uterus for dinner.  He had 1 episode of loose stool this morning.  Suspect this might have been related to the meal time he ate it as well as this being the only food that he ate yesterday.  Will prescribe him some Protonix to trial for a month I told him that if this seems to help with some of the symptoms he may continue with over-the-counter Prilosec.  He should establish care with a PCP at some point in time.  He is also given some Zofran in case nausea recurs.  Proceed to ED with any worsening abdominal pain and nausea worsening vomiting fever chills.    There are no Patient Instructions on file for this visit.

## 2024-06-08 NOTE — LETTER
June 8, 2024     Patient: Jordon Soliz  YOB: 1990  Date of Visit: 6/8/2024      To Whom it May Concern:    Jordon Soliz is under my professional care. Jordon was seen in my office on 6/8/2024. Jordon may return to work on 6/9/24, excuse him from work missed today .    If you have any questions or concerns, please don't hesitate to call.         Sincerely,          Kurt Pratt PA-C        CC: No Recipients

## 2024-06-10 NOTE — CARE ANYWHERE EVISITS
Visit Summary for DOT LOPEZ - Gender: Male - Date of Birth: 1990  Date: [id number] - Duration: 18 minutes  Patient: DOT LOPEZ  Provider: Kurt Pratt PA-C    Patient Contact Information  Address  [facility] [address]; [place] [address]  [phone]    Visit Topics  Headache [Added By: Self - 2024-06-08]  Stomachache [Added By: Self - 2024-06-08]    Triage Questions   What is your current physical address in the event of a medical emergency? Answer []  Are you allergic to any medications? Answer []  Are you now or could you be pregnant? Answer []  Do you have any immune system compromise or chronic lung   disease? Answer []  Do you have any vulnerable family members in the home (infant, pregnant, cancer, elderly)? Answer []     Conversation Transcripts  [0A][0A] [Notification] You are connected with Kurt Pratt PA-C, Urgent Care Specialist.[0A][Notification] DOT LOPEZ is located in [place].[0A][Notification] DOT LOPEZ has shared health history...[0A]    Diagnosis  Nausea with vomiting, unspecified    Procedures  Value: 91695 Code: CPT-4 UNLISTED E&M SERVICE    Medications Prescribed    No prescriptions ordered    Electronically signed by: Kurt Pratt PA-C(NPI [id number])

## 2024-06-11 ENCOUNTER — AMB VIDEO VISIT (OUTPATIENT)
Dept: OTHER | Facility: HOSPITAL | Age: 34
End: 2024-06-11

## 2024-06-11 DIAGNOSIS — R10.33 PERIUMBILICAL ABDOMINAL PAIN: Primary | ICD-10-CM

## 2024-06-11 PROCEDURE — ECARE PR SL URGENT CARE VIRTUAL VISIT: Performed by: PHYSICIAN ASSISTANT

## 2024-06-11 NOTE — PROGRESS NOTES
Required Documentation:  Encounter provider: Shannon D Severino, PA-C    Identify all parties in room with patient during virtual visit:  No one else    The patient was identified by name and date of birth. Jordon Soliz was informed that this is a telemedicine visit and that the visit is being conducted through the [employer] platform. He agrees to proceed..  My office door was closed. No one else was in the room.  He acknowledged consent and understanding of privacy and security of the video platform. The patient has agreed to participate and understands they can discontinue the visit at any time.    Verification of patient location:  Patient is located at Home in the following state in which I hold an active license [place]    Patient is aware this is a billable service.     Reason for visit is No chief complaint on file.       Subjective  HPI   Pt reports stomach ache starting yesterday at work. Pain is supraumbilical, constant, but at times it is worse. Reports it is an ache currently 5/10, at the worst 7-8/10. Last BM 2 hours ago, pain improved afterwards. Notes BM was small. Nothing tried for pain. Denies associated NVDC. No fever. Reports he has been eating pizza x 3 days. No hx abd surgery. Able to drink today    No past medical history on file.    No past surgical history on file.     No Known Allergies    Review of Systems   Constitutional:  Negative for fever.   HENT:  Negative for nosebleeds.    Eyes:  Negative for redness.   Respiratory:  Negative for shortness of breath.    Cardiovascular:  Negative for chest pain.   Gastrointestinal:  Positive for abdominal pain. Negative for blood in stool, diarrhea, nausea and vomiting.   Genitourinary:  Negative for hematuria.   Musculoskeletal:  Negative for gait problem.   Skin:  Negative for rash.   Neurological:  Negative for seizures.   Psychiatric/Behavioral:  Negative for behavioral problems.        Video Exam    There were no vitals filed for this  "visit.    Physical Exam  Constitutional:       General: He is not in acute distress.     Appearance: Normal appearance. He is not toxic-appearing.   HENT:      Head: Normocephalic and atraumatic.      Nose: No rhinorrhea.      Mouth/Throat:      Mouth: Mucous membranes are moist.   Eyes:      Conjunctiva/sclera: Conjunctivae normal.   Pulmonary:      Effort: Pulmonary effort is normal. No respiratory distress.      Breath sounds: No wheezing (no gross audible wheeze through computer).   Abdominal:      Tenderness: There is abdominal tenderness (mild LL abdominal).   Musculoskeletal:      Cervical back: Normal range of motion.   Skin:     Findings: No rash (on face or neck).   Neurological:      Mental Status: He is alert.      Cranial Nerves: No dysarthria or facial asymmetry.   Psychiatric:         Mood and Affect: Mood normal.         Behavior: Behavior normal.         Visit Time  Total Visit Duration: 11 minutes    Assessment/Plan:    Diagnoses and all orders for this visit:    Periumbilical abdominal pain        Patient Instructions   Schedule a follow-up appointment with your primary care physician for recheck in 2-3 days-especially if symptoms aren't improving. If you cannot see your PCP, you can schedule a follow up appointment at a [facility]. Go to the emergency department if you develop any new or worsening symptoms including abdominal pain especially with fever, or anything else that is concerning.    Excuses can be found in \"Letters\" section of [employer] amanda. Can print if opened from a web browser  Care Anywhere phone number is [phone] if you need assistance or have further questions    1 [phone] [facility] ([phone])  Schedule or Reschedule Outpatient Testing - Option 2  Billing - Option 3  General Info - Option 4  [facility] Help - Option 5  Comprehensive Spine Program - Option 6   COVID - Option 7    "

## 2024-06-11 NOTE — CARE ANYWHERE EVISITS
Visit Summary for DOT LOPEZ - Gender: Male - Date of Birth: 1990  Date: [id number] - Duration: 10 minutes  Patient: DOT LOPEZ  Provider: Shannon Severino PA-C    Patient Contact Information  [place]  [address]; [place] [address]  [phone]    Visit Topics  Stomachache [Added By: Self - 2024-06-11]    Triage Questions   What is your current physical address in the event of a medical emergency? Answer []  Are you allergic to any medications? Answer []  Are you now or could you be pregnant? Answer []  Do you have any immune system compromise or chronic lung   disease? Answer []  Do you have any vulnerable family members in the home (infant, pregnant, cancer, elderly)? Answer []     Conversation Transcripts  [0A][0A] [Notification] You are connected with Shannon Severino PA-C, Urgent Care Specialist.[0A][Notification] DOT LOPEZ is located in [place].[0A][Notification] DOT LOPEZ has shared health history...[0A]    Diagnosis  Periumbilical pain    Procedures  Value: [id number] Code: CPT-4 UNLISTED E&M SERVICE    Medications Prescribed    No prescriptions ordered    Electronically signed by: Severino PA-C, Shannon(NPI [id number])

## 2024-06-11 NOTE — LETTER
June 11, 2024     Patient: Jordon Soliz   YOB: 1990   Date of Visit: 6/11/2024       To Whom it May Concern:    Jordon Soliz is under my professional care. He was seen virtually on 6/11/2024. He may return to work on 6/12/24 .    If you have any questions or concerns, please don't hesitate to call.         Sincerely,          Shannon D Severino, PA-C        CC: No Recipients

## 2024-06-11 NOTE — PATIENT INSTRUCTIONS
"Schedule a follow-up appointment with your primary care physician for recheck in 2-3 days-especially if symptoms aren't improving. If you cannot see your PCP, you can schedule a follow up appointment at a [facility]. Go to the emergency department if you develop any new or worsening symptoms including abdominal pain especially with fever, or anything else that is concerning.    Excuses can be found in \"Letters\" section of [employer] amanda. Can print if opened from a web browser  Care Anywhere phone number is [phone] if you need assistance or have further questions    [phone] [facility] ([phone])  Schedule or Reschedule Outpatient Testing - Option 2  Billing - Option 3  General Info - Option 4  [employer] Help - Option 5  Comprehensive Spine Program - Option 6   COVID - Option 7    "

## 2024-06-18 RX ORDER — OLOPATADINE HYDROCHLORIDE 1 MG/ML
1 SOLUTION/ DROPS OPHTHALMIC 2 TIMES DAILY
Qty: 5 ML | Refills: 0 | Status: SHIPPED | OUTPATIENT
Start: 2024-06-18

## 2024-06-19 ENCOUNTER — AMB VIDEO VISIT (OUTPATIENT)
Dept: OTHER | Facility: HOSPITAL | Age: 34
End: 2024-06-19

## 2024-06-19 DIAGNOSIS — H10.13 ALLERGIC CONJUNCTIVITIS OF BOTH EYES: Primary | ICD-10-CM

## 2024-06-19 NOTE — CARE ANYWHERE EVISITS
Visit Summary for DOT LOPEZ - Gender: Male - Date of Birth: 1990  Date: [id number] - Duration: 3 minutes  Patient: DOT LOPEZ  Provider: Tanika DUTTA    Patient Contact Information  Address  [address]; [place] [address]  [phone]    Visit Topics  allergies [Added By: Self - 2024-06-19]    Triage Questions   What is your current physical address in the event of a medical emergency? Answer []  Are you allergic to any medications? Answer []  Are you now or could you be pregnant? Answer []  Do you have any immune system compromise or chronic lung   disease? Answer []  Do you have any vulnerable family members in the home (infant, pregnant, cancer, elderly)? Answer []     Conversation Transcripts  [0A][0A] [Notification] You are connected with Tanika DUTTA, Urgent Care Specialist.[0A][Notification] DOT LOPEZ is located in [place].[0A][Notification] DOT LOPEZ has shared health history...[0A]    Diagnosis  Allergic rhinitis, unspecified    Procedures  Value: [id number] Code: CPT-4 UNLISTED E&M SERVICE    Medications Prescribed    No prescriptions ordered    Electronically signed by: Tanika Seals(NPI [id number])

## 2024-06-19 NOTE — LETTER
June 18, 2024     Patient: Jordon Soliz   YOB: 1990   Date of Visit: 6/19/2024       To Whom it May Concern:    Jordon Soliz was seen in my clinic on 6/19/2024. He may return to work on 6/20/2024 .    If you have any questions or concerns, please don't hesitate to call.         Sincerely,          LUZMARIA Barclay        CC: No Recipients

## 2024-06-19 NOTE — PROGRESS NOTES
Video Visit - Jordon Soliz 34 y.o. male MRN: [id number]    REQUIRED DOCUMENTATION:         1. This service was provided via [employer].  2. Provider located at [address] [id number] [phone].  3. [facility] provider: LUZMARIA Barclay.  4. Identify all parties in room with patient during [facility] visit:  myself  5. After connecting through [employer], patient was identified by name and date of birth.  Patient was then informed that this was a Telemedicine visit and that the exam was being conducted confidentially over secure lines.  My office door was closed. No one else was in the room.  Patient acknowledged consent and understanding of privacy and security of the Telemedicine visit.  I informed the patient that I have reviewed their record in Epic and presented the opportunity for them to ask any questions regarding the visit today.  The patient agreed to participate.      Eye Problem   Both eyes are affected. This is a recurrent (hx of seasonal allergies) problem. The current episode started yesterday. The problem occurs constantly. The problem has been unchanged. There was no injury mechanism. The patient is experiencing no pain. There is No known exposure to pink eye. He Does not wear contacts. Associated symptoms include itching. Pertinent negatives include no blurred vision, eye discharge, double vision, eye redness, fever, foreign body sensation, photophobia or recent URI. Treatments tried: zyrtec. The treatment provided mild relief.     Review of Systems   Constitutional:  Negative for chills, diaphoresis, fatigue and fever.   HENT:  Negative for congestion, ear pain, facial swelling, mouth sores, postnasal drip, rhinorrhea, sinus pressure, sinus pain, sore throat and trouble swallowing.    Eyes:  Positive for itching. Negative for blurred vision, double vision, photophobia, pain, discharge, redness and visual disturbance.   Respiratory:  Negative for cough,  chest tightness and shortness of breath.    Cardiovascular: Negative.    Gastrointestinal: Negative.    Genitourinary: Negative.    Musculoskeletal:  Negative for arthralgias, back pain, joint swelling, myalgias, neck pain and neck stiffness.   Skin:  Negative for rash.   Neurological: Negative.      Physical Exam  Constitutional:       General: He is not in acute distress.     Appearance: He is well-developed. He is not diaphoretic.   HENT:      Nose:      Right Sinus: No maxillary sinus tenderness or frontal sinus tenderness.      Left Sinus: No maxillary sinus tenderness or frontal sinus tenderness.   Eyes:      General: Lids are normal.      Extraocular Movements: Extraocular movements intact.      Conjunctiva/sclera: Conjunctivae normal.      Pupils: Pupils are equal, round, and reactive to light.   Cardiovascular:      Comments: Unable to assess in this setting  Pulmonary:      Effort: Pulmonary effort is normal.      Comments: Patient able to converse in full sentences, easy non-labored respirations noted. No dyspnea observed.      Lymphadenopathy:      Cervical: No cervical adenopathy.   Skin:     Coloration: Skin is not pale.   Neurological:      Mental Status: He is alert and oriented to person, place, and time.       Diagnoses and all orders for this visit:    Allergic conjunctivitis of both eyes  -     olopatadine (PATANOL) 0.1 % ophthalmic solution; Administer 1 drop to both eyes 2 (two) times a day      Patient Instructions   Use drops as directed. Continue daily zyrtec. You can add flonase if symptoms persist. Follow up with PCP in 3-5 days. Proceed to the ER if symptoms worsen.      Follow up with PCP if not improved, if symptoms are worse, go to the ER.

## 2024-06-19 NOTE — PATIENT INSTRUCTIONS
Use drops as directed. Continue daily zyrtec. You can add flonase if symptoms persist. Follow up with PCP in 3-5 days. Proceed to the ER if symptoms worsen.

## 2024-11-10 ENCOUNTER — HOSPITAL ENCOUNTER (EMERGENCY)
Facility: HOSPITAL | Age: 34
Discharge: HOME | End: 2024-11-10

## 2024-11-10 ENCOUNTER — APPOINTMENT (EMERGENCY)
Dept: CT IMAGING | Facility: HOSPITAL | Age: 34
End: 2024-11-10

## 2024-11-10 VITALS
HEART RATE: 83 BPM | DIASTOLIC BLOOD PRESSURE: 82 MMHG | OXYGEN SATURATION: 96 % | RESPIRATION RATE: 22 BRPM | BODY MASS INDEX: 30.73 KG/M2 | HEIGHT: 64 IN | SYSTOLIC BLOOD PRESSURE: 141 MMHG | TEMPERATURE: 97.8 F | WEIGHT: 180 LBS

## 2024-11-10 DIAGNOSIS — S03.2XXA TOOTH AVULSION, INITIAL ENCOUNTER: Primary | ICD-10-CM

## 2024-11-10 DIAGNOSIS — F10.929 ALCOHOL INTOXICATION (HCC): ICD-10-CM

## 2024-11-10 PROCEDURE — 70486 CT MAXILLOFACIAL W/O DYE: CPT

## 2024-11-10 PROCEDURE — 99283 EMERGENCY DEPT VISIT LOW MDM: CPT

## 2024-11-10 PROCEDURE — 99284 EMERGENCY DEPT VISIT MOD MDM: CPT

## 2024-11-10 PROCEDURE — 70450 CT HEAD/BRAIN W/O DYE: CPT

## 2024-11-10 RX ORDER — LIDOCAINE HYDROCHLORIDE AND EPINEPHRINE 10; 10 MG/ML; UG/ML
10 INJECTION, SOLUTION INFILTRATION; PERINEURAL ONCE
Status: COMPLETED | OUTPATIENT
Start: 2024-11-10 | End: 2024-11-10

## 2024-11-10 RX ORDER — TRANEXAMIC ACID 100 MG/ML
500 INJECTION, SOLUTION INTRAVENOUS ONCE
Status: COMPLETED | OUTPATIENT
Start: 2024-11-10 | End: 2024-11-10

## 2024-11-10 RX ADMIN — TRANEXAMIC ACID 500 MG: 1 INJECTION, SOLUTION INTRAVENOUS at 01:52

## 2024-11-10 RX ADMIN — LIDOCAINE HYDROCHLORIDE,EPINEPHRINE BITARTRATE 10 ML: 10; .01 INJECTION, SOLUTION INFILTRATION; PERINEURAL at 01:49

## 2024-11-10 NOTE — ED PROVIDER NOTES
Time reflects when diagnosis was documented in both MDM as applicable and the Disposition within this note       Time User Action Codes Description Comment    11/10/2024  2:33 AM Souleymane Harrison [S03.2XXA] Tooth avulsion, initial encounter     11/10/2024  2:33 AM Souleymane Harrison [F10.929] Alcohol intoxication (HCC)           ED Disposition       ED Disposition   Discharge    Condition   Stable    Date/Time   Sun Nov 10, 2024  2:33 AM    Comment   Jordon Soliz discharge to home/self care.                   Assessment & Plan       Medical Decision Making  Medical complexity: 34-year-old male fell while intoxicated, striking his face on the ground, obviously avulsed tooth #9 and 10.  Will evaluate for other facial fractures with CT facial bones, will evaluate for intracranial abnormality with CT head.  Will control patient's bleeding with vasoconstrictor (epinephrine low-dose and lidocaine), and TXA topical treatment.    Reassessment/disposition: No facial fractures identified other than a small avulsion fracture near the base of the alveolar ridge.  Patient significant trauma, advised that he follow-up with oral maxillofacial surgery.  Dentistry information also given to the patient as he has poor dentition at baseline.  No indication at this time for antibiotics.  At time of discharge, patient had hemostasis from the gum in the lip.  No indication for closure of the labial laceration.    Amount and/or Complexity of Data Reviewed  Radiology: ordered and independent interpretation performed. Decision-making details documented in ED Course.    Risk  Prescription drug management.        ED Course as of 11/10/24 0754   Sun Nov 10, 2024   0152 CT facial bones without contrast  Complete loss of L maxillary incisor and adjacent tooth (#9,#10).  Small associated avulsion fracture of the avleolar ridge.  No other fractures.       Medications   lidocaine-epinephrine (XYLOCAINE/EPINEPHRINE) 1 %-1:100,000 injection 10 mL  (10 mL Infiltration Given 11/10/24 0149)   tranexamic acid 100mg/mL (TOPICAL/EPISTAXIS) 500 mg (500 mg Topical Given 11/10/24 0152)       ED Risk Strat Scores                           SBIRT 22yo+      Flowsheet Row Most Recent Value   Initial Alcohol Screen: US AUDIT-C     1. How often do you have a drink containing alcohol? 0 Filed at: 11/10/2024 0050   Audit-C Score 0 Filed at: 11/10/2024 0050                            History of Present Illness       Chief Complaint   Patient presents with    Dental Pain     Walking and fell face first into concrete. Missing multiple front teeth       History reviewed. No pertinent past medical history.   History reviewed. No pertinent surgical history.   History reviewed. No pertinent family history.   Social History     Tobacco Use    Smoking status: Never    Smokeless tobacco: Never      E-Cigarette/Vaping      E-Cigarette/Vaping Substances      I have reviewed and agree with the history as documented.     This is a 34-year-old male who is presenting with multiple tooth avulsions and a lip laceration following a fall while intoxicated.  The fall occurred approximately 30 minutes prior to his arrival here in the emergency department.  It was witnessed by his significant other who was also his designated .  He was exiting the building trying to get into his car whenever he lost his footing on the curb causing him to fall forward striking his face on the concrete curb.  His significant other reported that he did not lose consciousness, was able to get himself up off of the ground without difficulty, but had significant amounts of blood from around his mouth.  It was immediately evident that the patient was missing teeth from his upper maxillary incisor and front tooth area.  Patient has very poor dentition at baseline and has had multiple dental procedures in the past.  He reports no other injuries from today's fall.  He is not on any anticoagulants or antiplatelet  agents.  His significant other reports that other than the alcohol intoxication, he is acting completely like his normal self.        Review of Systems   Constitutional:  Negative for chills, fatigue and fever.   HENT:  Negative for congestion and sore throat.    Eyes:  Negative for pain and visual disturbance.   Respiratory:  Negative for cough, chest tightness and shortness of breath.    Cardiovascular:  Negative for chest pain and palpitations.   Gastrointestinal:  Negative for abdominal pain, blood in stool, constipation, diarrhea, nausea and vomiting.   Genitourinary:  Negative for dysuria, flank pain and hematuria.   Musculoskeletal:  Negative for arthralgias, back pain and neck pain.   Skin:  Positive for wound. Negative for color change and rash.   Neurological:  Negative for dizziness, syncope and light-headedness.   Hematological:  Negative for adenopathy. Does not bruise/bleed easily.   All other systems reviewed and are negative.          Objective       ED Triage Vitals   Temperature Pulse Blood Pressure Respirations SpO2 Patient Position - Orthostatic VS   11/10/24 0052 11/10/24 0052 11/10/24 0052 11/10/24 0052 11/10/24 0052 11/10/24 0100   97.8 °F (36.6 °C) (!) 109 145/83 19 97 % Sitting      Temp src Heart Rate Source BP Location FiO2 (%) Pain Score    -- 11/10/24 0052 11/10/24 0100 -- 11/10/24 0050     Monitor Left arm  2      Vitals      Date and Time Temp Pulse SpO2 Resp BP Pain Score FACES Pain Rating User   11/10/24 0200 -- 83 96 % 22 141/82 -- --    11/10/24 0130 -- 95 97 % 22 135/82 -- --    11/10/24 0115 -- 103 94 % 22 144/85 -- --    11/10/24 0100 -- 108 96 % 19 134/71 -- -- BW   11/10/24 0052 97.8 °F (36.6 °C) 109 97 % 19 145/83 -- -- CP   11/10/24 0050 -- -- -- -- -- 2 -- CP            Physical Exam  Vitals and nursing note reviewed.   Constitutional:       General: He is not in acute distress.     Appearance: He is well-developed and normal weight. He is not toxic-appearing or  diaphoretic.   HENT:      Head: Normocephalic and atraumatic.      Right Ear: External ear normal.      Left Ear: External ear normal.      Nose: Nose normal. No congestion or rhinorrhea.      Mouth/Throat:      Mouth: Mucous membranes are moist. Injury and lacerations present.      Dentition: Abnormal dentition. Dental tenderness present.      Pharynx: No oropharyngeal exudate or posterior oropharyngeal erythema.        Comments: Normal occlusion, multiple fractured teeth, there is significant swelling of the gums around tooth #9 and 10.  There is ongoing venous ooze bleeding from the root of tooth 9 and 10.  There is a lip laceration on the left lower lip near the labial crease.  The laceration is not currently bleeding, it is approximately 0.5 cm, and well-approximated.  This does not cross the vermilion border.  Eyes:      General: No scleral icterus.     Extraocular Movements: Extraocular movements intact.      Conjunctiva/sclera: Conjunctivae normal.      Pupils: Pupils are equal, round, and reactive to light.   Cardiovascular:      Rate and Rhythm: Normal rate and regular rhythm.      Pulses: Normal pulses.      Heart sounds: No murmur heard.  Pulmonary:      Effort: Pulmonary effort is normal. No respiratory distress.      Breath sounds: Normal breath sounds. No wheezing or rales.   Abdominal:      Palpations: Abdomen is soft. There is no mass.      Tenderness: There is no abdominal tenderness. There is no right CVA tenderness, left CVA tenderness or guarding.      Hernia: No hernia is present.   Musculoskeletal:         General: No swelling. Normal range of motion.      Cervical back: Normal range of motion and neck supple.      Right lower leg: No edema.      Left lower leg: No edema.   Skin:     General: Skin is warm and dry.      Capillary Refill: Capillary refill takes less than 2 seconds.   Neurological:      General: No focal deficit present.      Mental Status: He is alert and oriented to person,  place, and time.   Psychiatric:         Mood and Affect: Mood normal.         Behavior: Behavior normal.         Results Reviewed       None            CT facial bones without contrast   ED Interpretation by Souleymane Harrison MD (11/10 0153)   Missing tooth #9 and 10      CT head wo contrast   ED Interpretation by Souleymane Harrison MD (11/10 0153)   No acute intracranial abnormality          Procedures    ED Medication and Procedure Management   Prior to Admission Medications   Prescriptions Last Dose Informant Patient Reported? Taking?   olopatadine (PATANOL) 0.1 % ophthalmic solution   No No   Sig: Administer 1 drop to both eyes 2 (two) times a day      Facility-Administered Medications: None     Discharge Medication List as of 11/10/2024  2:36 AM        CONTINUE these medications which have NOT CHANGED    Details   olopatadine (PATANOL) 0.1 % ophthalmic solution Administer 1 drop to both eyes 2 (two) times a day, Starting Tue 6/18/2024, Normal             ED SEPSIS DOCUMENTATION   Time reflects when diagnosis was documented in both MDM as applicable and the Disposition within this note       Time User Action Codes Description Comment    11/10/2024  2:33 AM Souleymane Harrison [S03.2XXA] Tooth avulsion, initial encounter     11/10/2024  2:33 AM Souleymane Harrison [F10.929] Alcohol intoxication (HCC)                  Souleymane Harrison MD  11/10/24 0754

## 2024-11-10 NOTE — DISCHARGE INSTRUCTIONS
You need to call us or his hospital to schedule an appoint with dentist and oral maxillofacial surgery.  Your teeth are missing and nonrepairable.  No indication for antibiotics at this time.  Need to monitor for ongoing bleeding.  If you continue to bleed, continue to use cold water rinses.  If you continue to drip blood, come back to the ED for reevaluation.  He will be in a lot of pain tomorrow, use ibuprofen and Tylenol as needed for pain relief.

## 2025-02-24 ENCOUNTER — TELEMEDICINE (OUTPATIENT)
Dept: OTHER | Facility: HOSPITAL | Age: 35
End: 2025-02-24

## 2025-02-24 DIAGNOSIS — K52.9 GASTROENTERITIS: Primary | ICD-10-CM

## 2025-02-24 PROCEDURE — ECARE PR SL URGENT CARE VIRTUAL VISIT: Performed by: NURSE PRACTITIONER

## 2025-02-24 RX ORDER — ONDANSETRON 4 MG/1
4 TABLET, FILM COATED ORAL EVERY 8 HOURS PRN
Qty: 6 TABLET | Refills: 0 | Status: SHIPPED | OUTPATIENT
Start: 2025-02-24 | End: 2025-02-26

## 2025-02-24 NOTE — PATIENT INSTRUCTIONS
"This is most likely viral gastroenteritis.  Rest and drink extra fluids.  Increase clear liquids such as sports drinks or Gatorade.  Advance to bland foods. Follow up with PCP if no improvement.  Go to ER with any worsening symptoms, abd pain or vomiting.     Patient Education     Viral gastroenteritis in adults   The Basics   Written by the doctors and editors at [facility]   What is viral gastroenteritis? -- Viral gastroenteritis is an infection that can cause diarrhea and vomiting. It happens when a person's stomach and intestines get infected with a virus (figure 1). One of the most common causes of gastroenteritis is norovirus. But other viruses can cause it, too.  People can get viral gastroenteritis if they:   Touch an infected person or a surface with the virus on it, and then don't wash their hands   Eat foods or drink liquids with the virus in them. If people with the virus don't wash their hands, they can spread it to food or liquids they touch.  What are the symptoms of viral gastroenteritis? -- The infection causes diarrhea and vomiting. People can have either diarrhea or vomiting, or both. These symptoms usually start suddenly, and can be severe.  Viral gastroenteritis can also cause:   Fever   Headache or muscle aches   Belly pain or cramping   Loss of appetite  If you have a lot of diarrhea and vomiting, your body can lose too much water. This is known as \"dehydration.\" Dehydration can make you feel thirsty, tired, dizzy, or even confused. It can also make your urine look dark yellow.  Severe dehydration can be life-threatening. Older people are more likely to get severe dehydration.  Will I need tests? -- Not usually. Your doctor or nurse should be able to tell if you have viral gastroenteritis by learning about your symptoms and doing an exam. But the doctor or nurse might do tests to check for dehydration or to see which virus is causing the infection. These tests can include:   Blood tests   Urine " "tests   Tests on a sample of bowel movement  Is there anything I can do on my own to feel better? -- Yes. You need to replace the body's fluids that are lost through vomiting and diarrhea:   Drink fluids when you are able. It might help to take small sips every 15 to 30 minutes. Try to drink more as you start to feel better.   When you have a lot of vomiting or diarrhea, your body loses both water and salt. Drinking fluids that contain some salt can help replace what your body has lost. Examples include \"oral rehydration solutions,\" sports drinks, and broth. If you drink a lot of plain water, make sure you are also eating. This will help your body keep the right salt and water balance.   Avoid drinks with a lot of sugar, like juice or soda. Avoid alcohol, too.   Eat when you are able. If you can keep food down, it's best to eat lean meats, fruits, vegetables, and whole-grain breads and cereals. Avoid eating foods with a lot of fat or sugar, which can make symptoms worse.  If you are an adult younger than 65 and you have a new bout of diarrhea, and no fever and no blood in your bowel movements, you can take medicine to stop diarrhea such as loperamide (brand name: Imodium) for 1 to 2 days. But if you are older than 65, have a fever, or have blood in your bowel movements, do not take these medicines without checking with your doctor.  If you have diabetes, you might need to check your blood sugar more often until you feel better. Ask your doctor or nurse about this.  Should I call the doctor or nurse? -- Call the doctor or nurse if you:   Have any symptoms of dehydration, like feeling very tired, thirsty, dizzy, or confused   Have diarrhea or vomiting that lasts longer than a few days   Vomit up blood, have bloody diarrhea, or have severe belly pain   Haven't been able to drink anything for many hours   Haven't needed to urinate in the past 6 to 8 hours (during the day)  How is viral gastroenteritis treated? -- Most " "people do not need any treatment, because their symptoms will get better on their own. But people with severe dehydration might need treatment in the hospital. This involves getting fluids through a thin tube that goes into a vein, called an \"IV.\"  Doctors do not treat viral gastroenteritis with antibiotics. That's because antibiotics treat infections that are caused by bacteria, not viruses.  Can viral gastroenteritis be prevented? -- Sometimes. To lower the chance of getting or spreading the infection, wash your hands well with soap and water:   After you use the bathroom   Before you eat   Before you prepare food  Also, if you are caring for a child in diapers, wash your hands well after changing diapers. Do not change diapers near where you cook or eat food.  All topics are updated as new evidence becomes available and our peer review process is complete.  This topic retrieved from [employer] on: Mar 06, 2024.  Topic 08054 Version 17.0  Release: 32.2.4 - C32.64  © 2024 UpToDate, Inc. and/or its affiliates. All rights reserved.  figure 1: Digestive system     This drawing shows the organs in the body that process food. Together, these organs are called the \"digestive system\" or \"digestive tract.\" As food travels through this system, the body absorbs nutrients and water.  Graphic 24525 Version 5.0  Consumer Information Use and Disclaimer   Disclaimer: This generalized information is a limited summary of diagnosis, treatment, and/or medication information. It is not meant to be comprehensive and should be used as a tool to help the user understand and/or assess potential diagnostic and treatment options. It does NOT include all information about conditions, treatments, medications, side effects, or risks that may apply to a specific patient. It is not intended to be medical advice or a substitute for the medical advice, diagnosis, or treatment of a health care provider based on the health care provider's examination " and assessment of a patient's specific and unique circumstances. Patients must speak with a health care provider for complete information about their health, medical questions, and treatment options, including any risks or benefits regarding use of medications. This information does not endorse any treatments or medications as safe, effective, or approved for treating a specific patient. UpToDate, Inc. and its affiliates disclaim any warranty or liability relating to this information or the use thereof.The use of this information is governed by the Terms of Use, available at https://www.woltersINFIMETuwer.com/en/know/clinical-effectiveness-terms. 2024© UpToDate, Inc. and its affiliates and/or licensors. All rights reserved.  Copyright   © 2024 UpToDate, Inc. and/or its affiliates. All rights reserved.

## 2025-02-24 NOTE — PROGRESS NOTES
Virtual Regular Visit  Name: Jordon Soliz      : 1990      MRN: [id number]  Encounter Provider: Your Video Visit Provider  Encounter Date: 2025   Encounter department: VIRTUAL CARE       Verification of patient location:  Patient is located at Home in the following state in which I hold an active license [place] :  Assessment & Plan  Gastroenteritis    Orders:    ondansetron (ZOFRAN) 4 mg tablet; Take 1 tablet (4 mg total) by mouth every 8 (eight) hours as needed for vomiting for up to 2 days        Encounter provider Your Video Visit Provider    The patient was identified by name and date of birth. Jordon Soliz was informed that this is a telemedicine visit and that the visit is being conducted through the Epic Embedded platform. He agrees to proceed..  My office door was closed. No one else was in the room.  He acknowledged consent and understanding of privacy and security of the video platform. The patient has agreed to participate and understands they can discontinue the visit at any time.    Patient is aware this is a billable service.     History obtained from: patient  History of Present Illness     This is a 34 year old male here today for video visit.  He states he has been vomiting about 2 hours ago. He states he has been feeling sick all day.  He is having some diarrhea.  He states he is not having fever but he does have chills.  He states no else is sick at home.  He is having upper abd pain.  He has some discomfort with palpation.  No blood in stool. Last episode was vomiting about 1 hour ago.  He states he drinking some Gatorade, mild headache.       Review of Systems   Constitutional:  Negative for activity change, chills and fatigue.   HENT: Negative.     Gastrointestinal:  Positive for abdominal pain, nausea and vomiting.   Neurological: Negative.    Psychiatric/Behavioral: Negative.         Objective   There were no vitals taken for this visit.    Physical Exam  Constitutional:        General: He is not in acute distress.     Appearance: Normal appearance. He is not ill-appearing or toxic-appearing.   HENT:      Head: Atraumatic.   Abdominal:      General: Distension: mild upper abd..      Tenderness: There is abdominal tenderness.   Neurological:      Mental Status: He is alert and oriented to person, place, and time.   Psychiatric:         Mood and Affect: Mood normal.         Behavior: Behavior normal.         Thought Content: Thought content normal.         Judgment: Judgment normal.         Visit Time  Total Visit Duration: 7 minutes not including the time spent for establishing the audio/video connection.

## 2025-02-24 NOTE — LETTER
February 24, 2025     Patient: Jordon Soliz   YOB: 1990   Date of Visit: 2/24/2025       To Whom it May Concern:    Jordon Soliz is under my professional care. He was seen virtually on 2/24/2025. He may return to work on 02/25/2025 .    If you have any questions or concerns, please don't hesitate to call.         Sincerely,          Your Video Visit Provider        CC: No Recipients